# Patient Record
Sex: FEMALE | Race: BLACK OR AFRICAN AMERICAN | NOT HISPANIC OR LATINO | URBAN - METROPOLITAN AREA
[De-identification: names, ages, dates, MRNs, and addresses within clinical notes are randomized per-mention and may not be internally consistent; named-entity substitution may affect disease eponyms.]

---

## 2017-05-23 ENCOUNTER — ALLSCRIPTS OFFICE VISIT (OUTPATIENT)
Dept: OTHER | Facility: OTHER | Age: 47
End: 2017-05-23

## 2017-05-23 DIAGNOSIS — I10 ESSENTIAL (PRIMARY) HYPERTENSION: ICD-10-CM

## 2017-05-23 DIAGNOSIS — Z12.31 ENCOUNTER FOR SCREENING MAMMOGRAM FOR MALIGNANT NEOPLASM OF BREAST: ICD-10-CM

## 2017-05-23 DIAGNOSIS — R10.32 LEFT LOWER QUADRANT PAIN: ICD-10-CM

## 2018-01-16 NOTE — PROGRESS NOTES
Assessment    1  Annual physical exam (V70 0) (Z00 00)   2  Benign essential hypertension (401 1) (I10)   3  Family history of  : Father   3  Family history of hypertension (V17 49) (Z82 49) : Father   5  Abdominal pain, LLQ (left lower quadrant) (789 04) (R10 32)   6  Visit for screening mammogram (V76 12) (Z12 31)    Plan  Abdominal pain, LLQ (left lower quadrant)    · (1) URINALYSIS w URINE C/S REFLEX (will reflex a microscopy if leukocytes, occult  blood, or nitrites are not within normal limits); Status:Active; Requested for:2017;    · Follow-up visit in 2 weeks Evaluation and Treatment  Follow-up  Status: Hold For -  Scheduling  Requested for: 15UGW7440  Benign essential hypertension    · HydroCHLOROthiazide 25 MG Oral Tablet; Take 1 tablet daily   · Begin a limited exercise program ; Status:Complete;   Done: 06FXX4078   · Continue with our present treatment plan ; Status:Complete;   Done: 31PQD5036   · Eat a low fat and low cholesterol diet ; Status:Complete;   Done: 02VEG6524   · Eat no more than 30 grams of fat per day ; Status:Complete;   Done: 95BVA4721   · Keep a diary of when and what you eat ; Status:Complete;   Done: 81HDC4944   · Restrict the salt in your diet by avoiding highly salted foods ; Status:Complete;   Done:  16HAR9965   · Restrict your sodium (salt) intake to 2 grams per day ; Status:Complete;   Done:  21TWQ5509   · (1) CBC/ PLT (NO DIFF); Status:Active; Requested for:2017;    · (1) COMPREHENSIVE METABOLIC PANEL; Status:Active; Requested for:2017;    · (1) LIPID PANEL, FASTING; Status:Active; Requested for:2017;    · (1) MICROALBUMIN CREATININE RATIO, RANDOM URINE; Status:Active; Requested  for:2017;    · (1) TSH; Status:Active;  Requested for:2017;   Visit for screening mammogram    · * MAMMO SCREENING BILATERAL W CAD; Status:Hold For - Scheduling; Requested  for:2017;     Discussion/Summary  health maintenance visit Currently, she eats a healthy diet  Had hysterectomy Breast cancer screening: mammogram has been ordered  Colorectal cancer screening: colorectal cancer screening is not indicated  Screening lab work includes hemoglobin, glucose, lipid profile, thyroid function testing and urinalysis  The immunizations are up to date  Advice and education were given regarding vitamin D supplements  Patient discussion: discussed with the patient  Advised to have labs and refill her hydrochlorothiazide, and will TREAT for hypertension and will follow-up,  She is up-to-date on immunization for Adacel, she should take calcium and vitamin D regularly and start doing exercise and eat low-fat diet  Mammogram will be done  Chief Complaint  NEW PT PREVENTATIVE      History of Present Illness  HM, Adult Female: The patient is being seen for a health maintenance evaluation  General Health:   Screening:   HPI: physical,   she was seen in the [facility] ER for tingling numbness in the left side of her body at that time her blood pressure was very high so they started her on hydrochlorothiazide but after a month she is now out of medication for 3 weeks,      Review of Systems    Constitutional: No fever, no chills, feels well, no tiredness, no recent weight gain or weight loss  Eyes: No complaints of eye pain, no red eyes, no eyesight problems, no discharge, no dry eyes, no itching of eyes  ENT: no complaints of earache, no loss of hearing, no nose bleeds, no nasal discharge, no sore throat, no hoarseness  Cardiovascular: No complaints of slow heart rate, no fast heart rate, no chest pain, no palpitations, no leg claudication, no lower extremity edema  Respiratory: No complaints of shortness of breath, no wheezing, no cough, no SOB on exertion, no orthopnea, no PND  Gastrointestinal: No complaints of abdominal pain, no constipation, no nausea or vomiting, no diarrhea, no bloody stools     Genitourinary: No complaints of dysuria, no incontinence, no pelvic pain, no dysmenorrhea, no vaginal discharge or bleeding  Musculoskeletal: No complaints of arthralgias, no myalgias, no joint swelling or stiffness, no limb pain or swelling  Integumentary: No complaints of skin rash or lesions, no itching, no skin wounds, no breast pain or lump  Neurological: No complaints of headache, no confusion, no convulsions, no numbness, no dizziness or fainting, no tingling, no limb weakness, no difficulty walking  Psychiatric: Not suicidal, no sleep disturbance, no anxiety or depression, no change in personality, no emotional problems  Endocrine: No complaints of proptosis, no hot flashes, no muscle weakness, no deepening of the voice, no feelings of weakness  Hematologic/Lymphatic: No complaints of swollen glands, no swollen glands in the neck, does not bleed easily, does not bruise easily  ROS reviewed  Active Problems    1  Anemia (285 9) (D64 9)   2  Anxiety disorder (300 00) (F41 9)   3  Benign essential hypertension (401 1) (I10)   4  Blood pressure elevated (401 9) (I10)   5  Leiomyoma of uterus (218 9) (D25 9)   6   Total Abdominal Hysterectomy    Past Medical History    · History of Abnormal electrocardiogram (794 31) (R94 31)   · History of Encounter for routine pelvic examination (V72 31) (Z01 419)   · History of constipation (V12 79) (Z87 19)   · History of headache (V13 89) (F75 392)   · History of menorrhagia (V13 29) (Z87 42)   · History of sebaceous cyst (V13 3) (Z87 2)   · History of Urinary Tract Infection (V13 02)   · History of Visit for pre-operative examination (V72 84) (Z01 818)   · History of Vulvovaginitis (616 10) (N76 0)   · History of Vulvovaginitis (616 10) (N76 0)    Surgical History    · History of  Section   · History of Hand Incision Tendon Sheath Of A Finger   · Total Abdominal Hysterectomy   · History of Total Abdominal Hysterectomy With Removal Of Both Ovaries   · History of Uterine Fibroid Embolization    Family History  Mother    · Family history of Hypertension (V17 49)  Father    · Family history of    · Family history of hypertension (V17 49) (Z82 49)  Family History    · Family history of Family Health Status Number Of Children    Social History    · Alcohol Use (History)   · denies   · Caffeine Use   · Marital History - Single   · Never A Smoker   · One child    Current Meds   1  One-A-Day Energy Formula TABS; 1 Every Morning; Therapy: [id number] to  Requested for: 24EEI3534 Recorded    Allergies    1  No Known Drug Allergies    2  IVP Dye    Vitals   Recorded: 03IIV4532 01:41PM   Heart Rate 76   Respiration 16   Systolic 848   Diastolic 82   Height 5 ft 1 in   Weight 150 lb    BMI Calculated 28 34   BSA Calculated 1 67     Physical Exam    Constitutional   General appearance: No acute distress, well appearing and well nourished  Head and Face   Head and face: Normal     Palpation of the face and sinuses: No sinus tenderness  Eyes   Conjunctiva and lids: No swelling, erythema or discharge  Pupils and irises: Equal, round, reactive to light  Ears, Nose, Mouth, and Throat   External inspection of ears and nose: Normal     Otoscopic examination: Tympanic membranes translucent with normal light reflex  Canals patent without erythema  Hearing: Normal     Nasal mucosa, septum, and turbinates: Normal without edema or erythema  Lips, teeth, and gums: Normal, good dentition  Oropharynx: Normal with no erythema, edema, exudate or lesions  Neck   Neck: Supple, symmetric, trachea midline, no masses  Thyroid: Normal, no thyromegaly  Pulmonary   Respiratory effort: No increased work of breathing or signs of respiratory distress  Percussion of chest: Normal     Palpation of chest: Normal     Auscultation of lungs: Clear to auscultation  Cardiovascular   Palpation of heart: Normal PMI, no thrills      Auscultation of heart: Normal rate and rhythm, normal S1 and S2, no murmurs  Examination of extremities for edema and/or varicosities: Normal     Chest   Breasts: Normal, no dimpling or skin changes appreciated  Palpation of breasts and axillae: Normal, no masses palpated  Abdomen   Abdomen: Non-tender, no masses  Liver and spleen: No hepatomegaly or splenomegaly  Genitourinary   Adnexa/Parametria: Normal, no masses or tenderness  Lymphatic   Palpation of lymph nodes in neck: No lymphadenopathy  Palpation of lymph nodes in axillae: No lymphadenopathy  Palpation of lymph nodes in groin: No lymphadenopathy  Palpation of lymph nodes in other areas: No lymphadenopathy  Musculoskeletal   Gait and station: Normal     Digits and nails: Normal without clubbing or cyanosis  Joints, bones, and muscles: Normal     Range of motion: Normal     Stability: Normal     Muscle strength/tone: Normal     Skin   Skin and subcutaneous tissue: Normal without rashes or lesions  Palpation of skin and subcutaneous tissue: Normal turgor  Neurologic   Cranial nerves: Cranial nerves II-XII intact  Reflexes: 2+ and symmetric  Sensation: No sensory loss  Psychiatric   Judgment and insight: Normal     Orientation to person, place, and time: Normal     Recent and remote memory: Intact  Mood and affect: Normal        Results/Data  PHQ-2 Adult Depression Screening [id number] 01:46PM User, [facility]     Test Name Result Flag Reference   PHQ-2 Adult Depression Score 2     Over the last two weeks, how often have you been bothered by any of the following problems?   Little interest or pleasure in doing things: Several days - 1  Feeling down, depressed, or hopeless: Several days - 1   PHQ-2 Adult Depression Screening Negative         Signatures   Electronically signed by : FATIMAH Evans ; May 23 2017  2:16PM EST                       (Author)

## 2018-01-22 VITALS
HEART RATE: 76 BPM | DIASTOLIC BLOOD PRESSURE: 82 MMHG | BODY MASS INDEX: 28.32 KG/M2 | SYSTOLIC BLOOD PRESSURE: 150 MMHG | RESPIRATION RATE: 16 BRPM | HEIGHT: 61 IN | WEIGHT: 150 LBS